# Patient Record
Sex: MALE | Race: WHITE | NOT HISPANIC OR LATINO | Employment: FULL TIME | URBAN - METROPOLITAN AREA
[De-identification: names, ages, dates, MRNs, and addresses within clinical notes are randomized per-mention and may not be internally consistent; named-entity substitution may affect disease eponyms.]

---

## 2022-05-10 ENCOUNTER — OFFICE VISIT (OUTPATIENT)
Dept: INTERNAL MEDICINE CLINIC | Facility: CLINIC | Age: 32
End: 2022-05-10
Payer: COMMERCIAL

## 2022-05-10 VITALS
WEIGHT: 290 LBS | BODY MASS INDEX: 41.52 KG/M2 | OXYGEN SATURATION: 98 % | TEMPERATURE: 97.7 F | HEIGHT: 70 IN | HEART RATE: 82 BPM

## 2022-05-10 DIAGNOSIS — E66.01 CLASS 3 SEVERE OBESITY DUE TO EXCESS CALORIES WITHOUT SERIOUS COMORBIDITY WITH BODY MASS INDEX (BMI) OF 40.0 TO 44.9 IN ADULT (HCC): ICD-10-CM

## 2022-05-10 DIAGNOSIS — M54.2 NECK PAIN: ICD-10-CM

## 2022-05-10 DIAGNOSIS — Z11.59 NEED FOR HEPATITIS C SCREENING TEST: ICD-10-CM

## 2022-05-10 DIAGNOSIS — G89.29 CHRONIC PAIN OF RIGHT KNEE: Primary | ICD-10-CM

## 2022-05-10 DIAGNOSIS — M25.561 CHRONIC PAIN OF RIGHT KNEE: Primary | ICD-10-CM

## 2022-05-10 DIAGNOSIS — E55.9 VITAMIN D DEFICIENCY: ICD-10-CM

## 2022-05-10 DIAGNOSIS — M54.50 CHRONIC MIDLINE LOW BACK PAIN WITHOUT SCIATICA: ICD-10-CM

## 2022-05-10 DIAGNOSIS — G44.219 EPISODIC TENSION-TYPE HEADACHE, NOT INTRACTABLE: ICD-10-CM

## 2022-05-10 DIAGNOSIS — G89.29 CHRONIC MIDLINE LOW BACK PAIN WITHOUT SCIATICA: ICD-10-CM

## 2022-05-10 PROBLEM — E66.813 CLASS 3 SEVERE OBESITY DUE TO EXCESS CALORIES WITHOUT SERIOUS COMORBIDITY IN ADULT (HCC): Status: ACTIVE | Noted: 2022-05-10

## 2022-05-10 PROCEDURE — 3008F BODY MASS INDEX DOCD: CPT | Performed by: INTERNAL MEDICINE

## 2022-05-10 PROCEDURE — 99205 OFFICE O/P NEW HI 60 MIN: CPT | Performed by: INTERNAL MEDICINE

## 2022-05-10 PROCEDURE — 3725F SCREEN DEPRESSION PERFORMED: CPT | Performed by: INTERNAL MEDICINE

## 2022-05-10 RX ORDER — MELOXICAM 15 MG/1
15 TABLET ORAL DAILY
Qty: 20 TABLET | Refills: 0 | Status: SHIPPED | OUTPATIENT
Start: 2022-05-10

## 2022-05-10 RX ORDER — LORATADINE 10 MG/1
10 TABLET ORAL DAILY
COMMUNITY

## 2022-05-10 RX ORDER — ACETAMINOPHEN 500 MG
500 TABLET ORAL EVERY 6 HOURS PRN
COMMUNITY

## 2022-05-10 NOTE — ASSESSMENT & PLAN NOTE
Differential diagnosis of headache were reviewed  There are only 4 or 5 episodes not time impressive not associated with any focal neurological symptoms  Will monitor the trend  Will get CBCs sed rate Lyme titer JEMIMA min    I do not believe there is a need for imaging at this time unless it gets worse patient is in agreement for that

## 2022-05-10 NOTE — ASSESSMENT & PLAN NOTE
Differential diagnosis of knee a reviewed the rule out DJD rule out DN range mint of ligaments  Rule out a [place] osteoarthritis patient also health of father with [employer] business and may be expose of risk  Will check CBCs CMP sed rate Lyme titer rheumatoid factor JEMIMA a P appetite is the TSH  Recommend to try Mobic 15 mg daily as needed      Side effects reviewed

## 2022-05-10 NOTE — ASSESSMENT & PLAN NOTE
Back prevention strategy reviewed  Will do lumbar spine x-ray along with knee x-ray    Will try Mobic 15 mg 1 daily as needed

## 2022-05-10 NOTE — PROGRESS NOTES
[facility] Office Visit Note  05/10/22     Hali Frost 32 y o  male MRN: [id number]  : 1990    Assessment:     1  Chronic pain of right knee  Assessment & Plan:  Differential diagnosis of knee a reviewed the rule out DJD rule out DN range mint of ligaments  Rule out a [place] osteoarthritis patient also health of father with [employer] business and may be expose of risk  Will check CBCs CMP sed rate Lyme titer rheumatoid factor JEMIMA a P appetite is the TSH  Recommend to try Mobic 15 mg daily as needed  Side effects reviewed     Orders:  -     XR knee 3 vw left non injury; Future; Expected date: 2022  -     Comprehensive metabolic panel; Future; Expected date: 2022  -     CBC and differential; Future; Expected date: 2022  -     Lipid panel; Future; Expected date: 2022  -     Vitamin D 25 hydroxy; Future  -     Sedimentation rate, automated; Future; Expected date: 2022  -     RF Screen w/ Reflex to Titer; Future; Expected date: 2022  -     Antinuclear Antibodies (JEMIMA), IFA; Future; Expected date: 2022  -     Lyme Antibody Profile with reflex to WB; Future; Expected date: 2022  -     Hepatitis C antibody; Future; Expected date: 2022  -     TSH, 3rd generation; Future; Expected date: 2022  -     XR spine lumbar minimum 4 views non injury; Future; Expected date: 05/10/2022  -     UA (URINE) with reflex to Scope  -     meloxicam (Mobic) 15 mg tablet; Take 1 tablet (15 mg total) by mouth daily    2  Chronic midline low back pain without sciatica  Assessment & Plan:  Back prevention strategy reviewed  Will do lumbar spine x-ray along with knee x-ray  Will try Mobic 15 mg 1 daily as needed    Orders:  -     XR knee 3 vw left non injury; Future; Expected date: 2022  -     Comprehensive metabolic panel; Future; Expected date: 2022  -     CBC and differential; Future; Expected date: 2022  -     Lipid panel;  Future; Expected date: 05/17/2022  -     Sedimentation rate, automated; Future; Expected date: 05/17/2022  -     RF Screen w/ Reflex to Titer; Future; Expected date: 05/17/2022  -     Antinuclear Antibodies (JEMIMA), IFA; Future; Expected date: 05/17/2022  -     Lyme Antibody Profile with reflex to WB; Future; Expected date: 05/17/2022  -     Hepatitis C antibody; Future; Expected date: 05/17/2022  -     TSH, 3rd generation; Future; Expected date: 05/17/2022  -     XR spine lumbar minimum 4 views non injury; Future; Expected date: 05/10/2022  -     UA (URINE) with reflex to Scope  -     meloxicam (Mobic) 15 mg tablet; Take 1 tablet (15 mg total) by mouth daily    3  Class 3 severe obesity due to excess calories without serious comorbidity with body mass index (BMI) of 40 0 to 44 9 in adult [facility])  Assessment & Plan:  Weight loss strategies reviewed  Will do appropriate lab work  Recommend to join gym  Recommend daily weight    BMI Counseling: The BMI is above normal  Know Body weight goal    Weigh yourself daily or weekly as per your doctor    Nutrition recommendations include decreasing portion sizes, encouraging healthy choices of fruits and vegetables, decreasing     fast food intake, consuming healthier snacks, limiting drinks that contain sugar, moderation in carbohydrate intake, increasing     intake of lean protein, reducing intake of saturated and trans fat and reducing intake of cholesterol      Orders:  -     Comprehensive metabolic panel; Future; Expected date: 05/17/2022  -     CBC and differential; Future; Expected date: 05/17/2022  -     Lipid panel; Future; Expected date: 05/17/2022  -     TSH, 3rd generation; Future; Expected date: 05/17/2022    4  Episodic tension-type headache, not intractable  Assessment & Plan:  Differential diagnosis of headache were reviewed  There are only 4 or 5 episodes not time impressive not associated with any focal neurological symptoms  Will monitor the trend    Will get CBCs sed rate Lyme titer JEMIMA min  I do not believe there is a need for imaging at this time unless it gets worse patient is in agreement for that      5  Neck pain  Assessment & Plan:  Neck examination is normal   No imaging is needed  Prevention strategy    Orders:  -     XR knee 3 vw left non injury; Future; Expected date: 05/17/2022  -     Comprehensive metabolic panel; Future; Expected date: 05/17/2022  -     CBC and differential; Future; Expected date: 05/17/2022  -     Lipid panel; Future; Expected date: 05/17/2022  -     Vitamin D 25 hydroxy; Future  -     Sedimentation rate, automated; Future; Expected date: 05/17/2022  -     RF Screen w/ Reflex to Titer; Future; Expected date: 05/17/2022  -     Antinuclear Antibodies (JEMIMA), IFA; Future; Expected date: 05/17/2022  -     Lyme Antibody Profile with reflex to WB; Future; Expected date: 05/17/2022  -     Hepatitis C antibody; Future; Expected date: 05/17/2022  -     TSH, 3rd generation; Future; Expected date: 05/17/2022  -     XR spine lumbar minimum 4 views non injury; Future; Expected date: 05/10/2022  -     UA (URINE) with reflex to Scope  -     meloxicam (Mobic) 15 mg tablet; Take 1 tablet (15 mg total) by mouth daily    6  Vitamin D deficiency  -     Vitamin D 25 hydroxy; Future    7  Need for hepatitis C screening test  -     Hepatitis C antibody; Future; Expected date: 05/17/2022        Discussion Summary and Plan: Today's care plan and medications were reviewed with patient in detail and all their questions answered to their satisfaction  No chief complaint on file  Subjective: This is the 1st office visit  35-year-old gentleman patient is a new patient visit  He has a son of 1 of our patient  He used to live in [place] recently moved with family here      Complains of right knee pain for last 2 years more on the lateral side denies any instability denies any fall injury denies any previous knee x-ray denies any previous DJD or any connective tissue disease home denies any fracture used to work some used to place some I soaking in the remote past   The has not seen any orthopedic the add did not have any x-ray did not have any medications tried or injection done  Denies any instability symptoms  Pain is mild to moderate  Takes a Tylenol at times  With partial relief  Pain exacerbated with activity whenever he is on knee  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     Denies any swelling of the joint  Denies any history of Lyme disease history of Lyme disease    Next complaint is that of a low back pain for last 1 year mind home often on pain is mild to moderate  Denies any radiation tingling numbness weakness of the lower extremity  Denies any bladder symptoms  Denies any previous back injury  Denies any previous back surgery  Has not seen any new orthopedic doctor  Never had physical therapy  Never had epidural injection done  No unusual arthritis history  Home no other connective tissue disease symptoms  Neck pain 4 times in last 2 months lasting for 5 minute to few hours denies any radiation tingling numbness weakness of the upper extremity  Neck next complaint next complaint is that of a episode of headache come lasting for few minutes to 1/2 hour in last 2 3 months of 3-4 episodes without any focal neurological symptoms  Denies any problem with eyes the TMJ swallowing difficulty and a  Has not had any association with neck pain  Neck pain is frontal   Does not have any any visual symptoms home  No focal weakness tingling numbness  No nausea vomiting  No fever or chills  No previous history of migraine brain tumor how polymyalgia rheumatica  Obesity BMI 41 61 weight you was 250 now it is 290 lb  He used to go to gym he does not go to gym    Will workup and will of weight loss advised      The following portions of the patient's history were reviewed and updated as appropriate: allergies, current medications, past family history, past medical history, past social history, past surgical history and problem list     Review of Systems   All other systems reviewed and are negative  Historical Information   Patient Active Problem List   Diagnosis    Chronic pain of right knee    Chronic midline low back pain without sciatica    Class 3 severe obesity due to excess calories without serious comorbidity in adult ([facility] [address] )    Episodic tension-type headache    Neck pain     Past Medical History:   Diagnosis Date    Allergic     Anxiety     Off and on     Past Surgical History:   Procedure Laterality Date    WISDOM TOOTH EXTRACTION       Social History     Substance and Sexual Activity   Alcohol Use Not Currently     Social History     Substance and Sexual Activity   Drug Use Never     Social History     Tobacco Use   Smoking Status Current Every Day Smoker   Smokeless Tobacco Never Used     Family History   Problem Relation Age of Onset    Asthma Mother     Thyroid disease Mother     Hyperlipidemia Mother     Hypertension Father      Health Maintenance Due   Topic    Hepatitis C Screening     COVID-19 Vaccine (1)    Pneumococcal Vaccine: Pediatrics (0 to 5 Years) and At-Risk Patients (6 to 59 Years) (1 of 2 - PPSV23)    HIV Screening     BMI: Followup Plan     Annual Physical     DTaP,Tdap,and Td Vaccines (1 - Tdap)      Meds/Allergies       Current Outpatient Medications:     acetaminophen (TYLENOL) 500 mg tablet, Take 500 mg by mouth every 6 (six) hours as needed for mild pain, Disp: , Rfl:     loratadine (CLARITIN) 10 mg tablet, Take 10 mg by mouth daily, Disp: , Rfl:     meloxicam (Mobic) 15 mg tablet, Take 1 tablet (15 mg total) by mouth daily, Disp: 20 tablet, Rfl: 0      Objective:    Vitals:   Pulse 82   Temp 97 7 °F (36 5 °C)   Ht 5' 10" (1 778 m)   Wt 132 kg (290 lb)   SpO2 98%   BMI 41 61 kg/m²   Body mass index is 41 61 kg/m²    Vitals:    05/10/22 1315   Weight: 132 kg (290 lb)       Physical Exam  Constitutional:       General: He is not in acute distress  Appearance: He is well-developed  He is not ill-appearing or diaphoretic  HENT:      Head: Normocephalic and atraumatic  Jaw: No swelling  Salivary Glands: Right salivary gland is not diffusely enlarged or tender  Left salivary gland is not diffusely enlarged or tender  Right Ear: Hearing, tympanic membrane, ear canal and external ear normal       Left Ear: Hearing, tympanic membrane, ear canal and external ear normal       Nose:      Right Turbinates: Not pale  Left Turbinates: Not pale  Mouth/Throat:      Mouth: No oral lesions  Tongue: No lesions  Pharynx: Oropharynx is clear  Tonsils: No tonsillar exudate  Eyes:      General: Lids are normal  No visual field deficit  Right eye: No discharge  Left eye: No discharge  Conjunctiva/sclera: Conjunctivae normal    Neck:      Thyroid: No thyroid mass or thyromegaly  Vascular: No carotid bruit or JVD  Trachea: Trachea normal    Cardiovascular:      Rate and Rhythm: Regular rhythm  Heart sounds: Normal heart sounds  Pulmonary:      Effort: No respiratory distress  Breath sounds: Normal breath sounds  No wheezing or rales  Abdominal:      General: Bowel sounds are normal  There is no distension  Palpations: There is no mass  Tenderness: There is no rebound  Musculoskeletal:      Cervical back: No signs of trauma, lacerations, rigidity, spasms, torticollis, tenderness, bony tenderness or crepitus  No pain with movement  Normal range of motion  Lumbar back: No lacerations, spasms, tenderness or bony tenderness  Normal range of motion  Negative right straight leg raise test and negative left straight leg raise test  No scoliosis  Right knee: Bony tenderness present  No swelling, deformity, effusion, erythema, ecchymosis or crepitus  Normal range of motion   Tenderness present over the lateral joint line  No MCL, LCL, ACL or PCL tenderness  Normal alignment, normal meniscus and normal patellar mobility  Right lower leg: No edema  Left lower leg: No edema  Lymphadenopathy:      Cervical: No cervical adenopathy  Skin:     General: Skin is warm  Coloration: Skin is not jaundiced or pale  Findings: No rash  Neurological:      Mental Status: He is alert and oriented to person, place, and time  Cranial Nerves: Cranial nerves are intact  No cranial nerve deficit, dysarthria or facial asymmetry  Sensory: No sensory deficit  Motor: No weakness, tremor, atrophy, abnormal muscle tone or pronator drift  Coordination: Romberg sign positive  Coordination normal       Gait: Gait normal       Deep Tendon Reflexes: Reflexes are normal and symmetric  Psychiatric:         Behavior: Behavior normal          Judgment: Judgment normal          Lab Review   No visits with results within 2 Month(s) from this visit  Latest known visit with results is:   No results found for any previous visit  Patient Instructions     Go for the blood test as ordered regarding your back pain neck pain right knee pain as well as for the weight evaluation  Consider joining a gym  Take meloxicam 15 mg 1 daily for knee pain or and oral low back pain  Consider getting a right knee x-ray and lumbar spine x-ray at your convenience  Be careful with weightlifting pulling pushing  Follow with Consultants as per their and our suggestion    Follow up in 3  week(s) or as needed earlier    Follow all instructions as advised and discussed  Take your medications as prescribed  Call the office immediately if you experience any side effects  Ask questions if you do not understand  Keep your scheduled appointment as advised or come sooner if necessary or in doubt  Best time to call for non-urgent matter or questions on weekdays is between 9am and 12 noon      See physician for any new symptoms or worsening of current symptoms  Urgent or emergent situations call 911 and report to nearest emergency room  I spent  60 minutes taking care of this patient including clinical care, conseling, collaboration, chart, lab and consultaion review  Patient is to get labs 1 week(s) prior to next visit if advised      Knee Pain   AMBULATORY CARE:   Knee pain  may start suddenly, or it may be a long-term problem  You may have pain on the side, front, or back of your knee  You may have knee stiffness and swelling  You may hear popping sounds or feel like your knee is giving way or locking up as you walk  You may feel pain when you sit, stand, walk, or climb up and down stairs  Knee pain can be caused by conditions such as obesity, inflammation, or strains or tears in ligaments or tendons  Seek care immediately if:   · Your pain is worse, even after treatment  · You cannot bend or straighten your leg completely  · The swelling around your knee does not go down even with treatment  · Your knee is painful and hot to the touch  Contact your healthcare provider if:   · You have questions or concerns about your condition or care  Treatment  will depend on the cause of your pain  You may need any of the following:  · NSAIDs  help decrease swelling and pain or fever  This medicine is available with or without a doctor's order  NSAIDs can cause stomach bleeding or kidney problems in certain people  If you take blood thinner medicine, always ask your healthcare provider if NSAIDs are safe for you  Always read the medicine label and follow directions  · Acetaminophen  decreases pain and fever  It is available without a doctor's order  Ask how much to take and how often to take it  Follow directions   Read the labels of all other medicines you are using to see if they also contain acetaminophen, or ask your doctor or pharmacist  Acetaminophen can cause liver damage if not taken correctly  Do not use more than 4 grams (4,000 milligrams) total of acetaminophen in one day  · Prescription pain medicine  may be given  Ask your healthcare provider how to take this medicine safely  Some prescription pain medicines contain acetaminophen  Do not take other medicines that contain acetaminophen without talking to your healthcare provider  Too much acetaminophen may cause liver damage  Prescription pain medicine may cause constipation  Ask your healthcare provider how to prevent or treat constipation  · Steroid injections  may be given into your knee  Steroids reduce inflammation and pain  · Surgery  may be used for some injuries, such as to repair a torn ACL  What you can do to manage your symptoms:   · Rest your knee so it can heal   Limit activities that increase your pain  Do low-impact exercises, such as walking or swimming  · Apply ice to help reduce swelling and pain  Use an ice pack, or put crushed ice in a plastic bag  Cover it with a towel before you apply it to your knee  Apply ice for 15 to 20 minutes every hour, or as directed  · Apply compression to help reduce swelling  Use a brace or bandage only as directed  · Elevate your knee to help decrease pain and swelling  Elevate your knee while you are sitting or lying down  Prop your leg on pillows to keep your knee above the level of your heart  · Prevent your knee from moving as directed  Your healthcare provider may put on a cast or splint  You may need to wear a leg brace to stabilize your knee  A leg brace can be adjusted to increase your range of motion as your knee heals  What you can do to prevent knee pain:   · Maintain a healthy weight  Extra weight increases your risk for knee pain  Ask your healthcare provider how much you should weigh  He or she can help you create a safe weight loss plan if you need to lose weight  · Exercise or train properly    Use the correct equipment for sports  Wear shoes that provide good support  Check your posture often as you exercise, play sports, or train for an event  This can help prevent stress and strain on your knees  Rest between sessions so you do not overwork your knees  Follow up with your healthcare provider within 24 hours or as directed:  Write down your questions so you remember to ask them during your visits  © Copyright [employer] 2022 Information is for End User's use only and may not be sold, redistributed or otherwise used for commercial purposes  All illustrations and images included in CareNotes® are the copyrighted property of [employer]  or Alex Fernando   The above information is an  only  It is not intended as medical advice for individual conditions or treatments  Talk to your doctor, nurse or pharmacist before following any medical regimen to see if it is safe and effective for you  Acute Low Back Pain   WHAT YOU NEED TO KNOW:   Acute low back pain is sudden discomfort that lasts up to 6 weeks and makes activity difficult  DISCHARGE INSTRUCTIONS:   Return to the emergency department if:   · You have severe pain  · You have sudden stiffness and heaviness on both buttocks down to both legs  · You have numbness or weakness in one leg, or pain in both legs  · You have numbness in your genital area or across your lower back  · You cannot control your urine or bowel movements  Call your doctor if:   · You have a fever  · You have pain at night or when you rest     · Your pain does not get better with treatment  · You have pain that worsens when you cough or sneeze  · You suddenly feel something pop or snap in your back  · You have questions or concerns about your condition or care  Medicines: You may need any of the following:  · NSAIDs , such as ibuprofen, help decrease swelling, pain, and fever  This medicine is available with or without a doctor's order   NSAIDs can cause stomach bleeding or kidney problems in certain people  If you take blood thinner medicine, always ask your healthcare provider if NSAIDs are safe for you  Always read the medicine label and follow directions  · Acetaminophen  decreases pain and fever  It is available without a doctor's order  Ask how much to take and how often to take it  Follow directions  Read the labels of all other medicines you are using to see if they also contain acetaminophen, or ask your doctor or pharmacist  Acetaminophen can cause liver damage if not taken correctly  Do not use more than 4 grams (4,000 milligrams) total of acetaminophen in one day  · Muscle relaxers  decrease pain by relaxing the muscles in your lower spine  · Prescription pain medicine  may be given  Ask your healthcare provider how to take this medicine safely  Some prescription pain medicines contain acetaminophen  Do not take other medicines that contain acetaminophen without talking to your healthcare provider  Too much acetaminophen may cause liver damage  Prescription pain medicine may cause constipation  Ask your healthcare provider how to prevent or treat constipation  Self-care:   · Stay active  as much as you can without causing more pain  Bed rest could make your back pain worse  Start with some light exercises, such as walking  Avoid heavy lifting until your pain is gone  Ask for more information about the activities or exercises that are right for you  · Apply heat  on your back for 20 to 30 minutes every 2 hours for as many days as directed  Heat helps decrease pain and muscle spasms  Alternate heat and ice  · Apply ice  on your back for 15 to 20 minutes every hour or as directed  Use an ice pack, or put crushed ice in a plastic bag  Cover it with a towel before you apply it to your skin  Ice helps prevent tissue damage and decreases swelling and pain  Prevent acute low back pain:   · Use proper body mechanics  ?  Bend at the hips and knees when you  objects  Do not bend from the waist  Use your leg muscles as you lift the load  Do not use your back  Keep the object close to your chest as you lift it  Try not to twist or lift anything above your waist          ? Change your position often when you stand for long periods of time  Rest one foot on a small box or footrest, and then switch to the other foot often  ? Try not to sit for long periods of time  When you do, sit in a straight-backed chair with your feet flat on the floor  Never reach, pull, or push while you are sitting  · Do exercises that strengthen your back muscles  Warm up before you exercise  Ask your healthcare provider the best exercises for you  · Maintain a healthy weight  Ask your healthcare provider what a healthy weight is for you  Ask him or her to help you create a weight loss plan if you are overweight  Follow up with your doctor as directed:  Return for a follow-up visit if you still have pain after 1 to 3 weeks of treatment  You may need to visit an orthopedist if your back pain lasts longer than 12 weeks  Write down your questions so you remember to ask them during your visits  © Copyright [employer] 2022 Information is for End User's use only and may not be sold, redistributed or otherwise used for commercial purposes  All illustrations and images included in CareNotes® are the copyrighted property of [employer] Inc  or [facility] Shawna Fernando   The above information is an  only  It is not intended as medical advice for individual conditions or treatments  Talk to your doctor, nurse or pharmacist before following any medical regimen to see if it is safe and effective for you  BMI Counseling:       The BMI is above normal  Know Body weight goal    Weigh yourself daily or weekly as per your doctor    Nutrition recommendations include decreasing portion sizes, encouraging healthy choices of fruits and vegetables, decreasing fast food intake, consuming healthier snacks, limiting drinks that contain sugar, moderation in carbohydrate intake, increasing     intake of lean protein, reducing intake of saturated and trans fat and reducing intake of cholesterol           Dr Abdon Jaramillo MD  [facility]       "This note has been constructed using a voice recognition system  Therefore there may be syntax, spelling, and/or grammatical errors   Please call if you have any questions  "

## 2022-05-10 NOTE — PATIENT INSTRUCTIONS
Go for the blood test as ordered regarding your back pain neck pain right knee pain as well as for the weight evaluation  Consider joining a gym  Take meloxicam 15 mg 1 daily for knee pain or and oral low back pain  Consider getting a right knee x-ray and lumbar spine x-ray at your convenience  Be careful with weightlifting pulling pushing  Follow with Consultants as per their and our suggestion    Follow up in 3  week(s) or as needed earlier    Follow all instructions as advised and discussed  Take your medications as prescribed  Call the office immediately if you experience any side effects  Ask questions if you do not understand  Keep your scheduled appointment as advised or come sooner if necessary or in doubt  Best time to call for non-urgent matter or questions on weekdays is between 9am and 12 noon  See physician for any new symptoms or worsening of current symptoms  Urgent or emergent situations call 911 and report to nearest emergency room  I spent  60 minutes taking care of this patient including clinical care, conseling, collaboration, chart, lab and consultaion review  Patient is to get labs 1 week(s) prior to next visit if advised      Knee Pain   AMBULATORY CARE:   Knee pain  may start suddenly, or it may be a long-term problem  You may have pain on the side, front, or back of your knee  You may have knee stiffness and swelling  You may hear popping sounds or feel like your knee is giving way or locking up as you walk  You may feel pain when you sit, stand, walk, or climb up and down stairs  Knee pain can be caused by conditions such as obesity, inflammation, or strains or tears in ligaments or tendons  Seek care immediately if:   · Your pain is worse, even after treatment  · You cannot bend or straighten your leg completely  · The swelling around your knee does not go down even with treatment  · Your knee is painful and hot to the touch      Contact your healthcare provider if:   · You have questions or concerns about your condition or care  Treatment  will depend on the cause of your pain  You may need any of the following:  · NSAIDs  help decrease swelling and pain or fever  This medicine is available with or without a doctor's order  NSAIDs can cause stomach bleeding or kidney problems in certain people  If you take blood thinner medicine, always ask your healthcare provider if NSAIDs are safe for you  Always read the medicine label and follow directions  · Acetaminophen  decreases pain and fever  It is available without a doctor's order  Ask how much to take and how often to take it  Follow directions  Read the labels of all other medicines you are using to see if they also contain acetaminophen, or ask your doctor or pharmacist  Acetaminophen can cause liver damage if not taken correctly  Do not use more than 4 grams (4,000 milligrams) total of acetaminophen in one day  · Prescription pain medicine  may be given  Ask your healthcare provider how to take this medicine safely  Some prescription pain medicines contain acetaminophen  Do not take other medicines that contain acetaminophen without talking to your healthcare provider  Too much acetaminophen may cause liver damage  Prescription pain medicine may cause constipation  Ask your healthcare provider how to prevent or treat constipation  · Steroid injections  may be given into your knee  Steroids reduce inflammation and pain  · Surgery  may be used for some injuries, such as to repair a torn ACL  What you can do to manage your symptoms:   · Rest your knee so it can heal   Limit activities that increase your pain  Do low-impact exercises, such as walking or swimming  · Apply ice to help reduce swelling and pain  Use an ice pack, or put crushed ice in a plastic bag  Cover it with a towel before you apply it to your knee   Apply ice for 15 to 20 minutes every hour, or as directed  · Apply compression to help reduce swelling  Use a brace or bandage only as directed  · Elevate your knee to help decrease pain and swelling  Elevate your knee while you are sitting or lying down  Prop your leg on pillows to keep your knee above the level of your heart  · Prevent your knee from moving as directed  Your healthcare provider may put on a cast or splint  You may need to wear a leg brace to stabilize your knee  A leg brace can be adjusted to increase your range of motion as your knee heals  What you can do to prevent knee pain:   · Maintain a healthy weight  Extra weight increases your risk for knee pain  Ask your healthcare provider how much you should weigh  He or she can help you create a safe weight loss plan if you need to lose weight  · Exercise or train properly  Use the correct equipment for sports  Wear shoes that provide good support  Check your posture often as you exercise, play sports, or train for an event  This can help prevent stress and strain on your knees  Rest between sessions so you do not overwork your knees  Follow up with your healthcare provider within 24 hours or as directed:  Write down your questions so you remember to ask them during your visits  © Copyright [employer] 2022 Information is for End User's use only and may not be sold, redistributed or otherwise used for commercial purposes  All illustrations and images included in CareNotes® are the copyrighted property of [employer]  or Alex Fernando   The above information is an  only  It is not intended as medical advice for individual conditions or treatments  Talk to your doctor, nurse or pharmacist before following any medical regimen to see if it is safe and effective for you  Acute Low Back Pain   WHAT YOU NEED TO KNOW:   Acute low back pain is sudden discomfort that lasts up to 6 weeks and makes activity difficult    DISCHARGE INSTRUCTIONS:   Return to the emergency department if:   · You have severe pain  · You have sudden stiffness and heaviness on both buttocks down to both legs  · You have numbness or weakness in one leg, or pain in both legs  · You have numbness in your genital area or across your lower back  · You cannot control your urine or bowel movements  Call your doctor if:   · You have a fever  · You have pain at night or when you rest     · Your pain does not get better with treatment  · You have pain that worsens when you cough or sneeze  · You suddenly feel something pop or snap in your back  · You have questions or concerns about your condition or care  Medicines: You may need any of the following:  · NSAIDs , such as ibuprofen, help decrease swelling, pain, and fever  This medicine is available with or without a doctor's order  NSAIDs can cause stomach bleeding or kidney problems in certain people  If you take blood thinner medicine, always ask your healthcare provider if NSAIDs are safe for you  Always read the medicine label and follow directions  · Acetaminophen  decreases pain and fever  It is available without a doctor's order  Ask how much to take and how often to take it  Follow directions  Read the labels of all other medicines you are using to see if they also contain acetaminophen, or ask your doctor or pharmacist  Acetaminophen can cause liver damage if not taken correctly  Do not use more than 4 grams (4,000 milligrams) total of acetaminophen in one day  · Muscle relaxers  decrease pain by relaxing the muscles in your lower spine  · Prescription pain medicine  may be given  Ask your healthcare provider how to take this medicine safely  Some prescription pain medicines contain acetaminophen  Do not take other medicines that contain acetaminophen without talking to your healthcare provider  Too much acetaminophen may cause liver damage  Prescription pain medicine may cause constipation   Ask your healthcare provider how to prevent or treat constipation  Self-care:   · Stay active  as much as you can without causing more pain  Bed rest could make your back pain worse  Start with some light exercises, such as walking  Avoid heavy lifting until your pain is gone  Ask for more information about the activities or exercises that are right for you  · Apply heat  on your back for 20 to 30 minutes every 2 hours for as many days as directed  Heat helps decrease pain and muscle spasms  Alternate heat and ice  · Apply ice  on your back for 15 to 20 minutes every hour or as directed  Use an ice pack, or put crushed ice in a plastic bag  Cover it with a towel before you apply it to your skin  Ice helps prevent tissue damage and decreases swelling and pain  Prevent acute low back pain:   · Use proper body mechanics  ? Bend at the hips and knees when you  objects  Do not bend from the waist  Use your leg muscles as you lift the load  Do not use your back  Keep the object close to your chest as you lift it  Try not to twist or lift anything above your waist          ? Change your position often when you stand for long periods of time  Rest one foot on a small box or footrest, and then switch to the other foot often  ? Try not to sit for long periods of time  When you do, sit in a straight-backed chair with your feet flat on the floor  Never reach, pull, or push while you are sitting  · Do exercises that strengthen your back muscles  Warm up before you exercise  Ask your healthcare provider the best exercises for you  · Maintain a healthy weight  Ask your healthcare provider what a healthy weight is for you  Ask him or her to help you create a weight loss plan if you are overweight  Follow up with your doctor as directed:  Return for a follow-up visit if you still have pain after 1 to 3 weeks of treatment   You may need to visit an orthopedist if your back pain lasts longer than 12 weeks  Write down your questions so you remember to ask them during your visits  © Copyright [employer] 2022 Information is for End User's use only and may not be sold, redistributed or otherwise used for commercial purposes  All illustrations and images included in CareNotes® are the copyrighted property of [employer]  or Alex Costello  The above information is an  only  It is not intended as medical advice for individual conditions or treatments  Talk to your doctor, nurse or pharmacist before following any medical regimen to see if it is safe and effective for you  BMI Counseling:       The BMI is above normal  Know Body weight goal    Weigh yourself daily or weekly as per your doctor    Nutrition recommendations include decreasing portion sizes, encouraging healthy choices of fruits and vegetables, decreasing     fast food intake, consuming healthier snacks, limiting drinks that contain sugar, moderation in carbohydrate intake, increasing     intake of lean protein, reducing intake of saturated and trans fat and reducing intake of cholesterol

## 2022-05-10 NOTE — ASSESSMENT & PLAN NOTE
Weight loss strategies reviewed  Will do appropriate lab work  Recommend to join gym  Recommend daily weight    BMI Counseling:       The BMI is above normal  Know Body weight goal    Weigh yourself daily or weekly as per your doctor    Nutrition recommendations include decreasing portion sizes, encouraging healthy choices of fruits and vegetables, decreasing     fast food intake, consuming healthier snacks, limiting drinks that contain sugar, moderation in carbohydrate intake, increasing     intake of lean protein, reducing intake of saturated and trans fat and reducing intake of cholesterol

## 2022-05-23 ENCOUNTER — HOSPITAL ENCOUNTER (OUTPATIENT)
Dept: RADIOLOGY | Facility: HOSPITAL | Age: 32
Discharge: HOME | End: 2022-05-23
Attending: INTERNAL MEDICINE
Payer: COMMERCIAL

## 2022-05-23 DIAGNOSIS — M54.50 CHRONIC MIDLINE LOW BACK PAIN WITHOUT SCIATICA: ICD-10-CM

## 2022-05-23 DIAGNOSIS — M54.2 NECK PAIN: ICD-10-CM

## 2022-05-23 DIAGNOSIS — M25.561 CHRONIC PAIN OF RIGHT KNEE: ICD-10-CM

## 2022-05-23 DIAGNOSIS — G89.29 CHRONIC MIDLINE LOW BACK PAIN WITHOUT SCIATICA: ICD-10-CM

## 2022-05-23 DIAGNOSIS — G89.29 CHRONIC PAIN OF RIGHT KNEE: ICD-10-CM

## 2022-05-23 PROCEDURE — 72110 X-RAY EXAM L-2 SPINE 4/>VWS: CPT

## 2022-05-23 PROCEDURE — 73562 X-RAY EXAM OF KNEE 3: CPT

## 2022-06-01 LAB
25(OH)D3 SERPL-MCNC: 32 NG/ML (ref 30–100)
ALBUMIN SERPL-MCNC: 4.3 G/DL (ref 3.6–5.1)
ALBUMIN/GLOB SERPL: 2 (CALC) (ref 1–2.5)
ALP SERPL-CCNC: 57 U/L (ref 36–130)
ALT SERPL-CCNC: 41 U/L (ref 9–46)
ANA SER QL IF: NEGATIVE
APPEARANCE UR: CLEAR
AST SERPL-CCNC: 20 U/L (ref 10–40)
B BURGDOR AB SER IA-ACNC: <0.9 INDEX
BASOPHILS # BLD AUTO: 53 CELLS/UL (ref 0–200)
BASOPHILS NFR BLD AUTO: 0.7 %
BILIRUB SERPL-MCNC: 1 MG/DL (ref 0.2–1.2)
BILIRUB UR QL STRIP: NEGATIVE
BUN SERPL-MCNC: 12 MG/DL (ref 7–25)
BUN/CREAT SERPL: NORMAL (CALC) (ref 6–22)
CALCIUM SERPL-MCNC: 9.1 MG/DL (ref 8.6–10.3)
CHLORIDE SERPL-SCNC: 104 MMOL/L (ref 98–110)
CHOLEST SERPL-MCNC: 148 MG/DL
CHOLEST/HDLC SERPL: 3.5 (CALC)
CO2 SERPL-SCNC: 29 MMOL/L (ref 20–32)
COLOR UR: YELLOW
CREAT SERPL-MCNC: 0.85 MG/DL (ref 0.6–1.35)
EOSINOPHIL # BLD AUTO: 213 CELLS/UL (ref 15–500)
EOSINOPHIL NFR BLD AUTO: 2.8 %
ERYTHROCYTE [DISTWIDTH] IN BLOOD BY AUTOMATED COUNT: 12.4 % (ref 11–15)
ERYTHROCYTE [SEDIMENTATION RATE] IN BLOOD BY WESTERGREN METHOD: 2 MM/H
GLOBULIN SER CALC-MCNC: 2.2 G/DL (CALC) (ref 1.9–3.7)
GLUCOSE SERPL-MCNC: 87 MG/DL (ref 65–99)
GLUCOSE UR QL STRIP: NEGATIVE
HCT VFR BLD AUTO: 43.8 % (ref 38.5–50)
HCV AB S/CO SERPL IA: <0.02
HCV AB SERPL QL IA: NORMAL
HDLC SERPL-MCNC: 42 MG/DL
HGB BLD-MCNC: 14.8 G/DL (ref 13.2–17.1)
HGB UR QL STRIP: NEGATIVE
KETONES UR QL STRIP: NEGATIVE
LDLC SERPL CALC-MCNC: 90 MG/DL (CALC)
LEUKOCYTE ESTERASE UR QL STRIP: NEGATIVE
LYMPHOCYTES # BLD AUTO: 2204 CELLS/UL (ref 850–3900)
LYMPHOCYTES NFR BLD AUTO: 29 %
MCH RBC QN AUTO: 28.6 PG (ref 27–33)
MCHC RBC AUTO-ENTMCNC: 33.8 G/DL (ref 32–36)
MCV RBC AUTO: 84.7 FL (ref 80–100)
MONOCYTES # BLD AUTO: 669 CELLS/UL (ref 200–950)
MONOCYTES NFR BLD AUTO: 8.8 %
NEUTROPHILS # BLD AUTO: 4461 CELLS/UL (ref 1500–7800)
NEUTROPHILS NFR BLD AUTO: 58.7 %
NITRITE UR QL STRIP: NEGATIVE
NONHDLC SERPL-MCNC: 106 MG/DL (CALC)
PH UR STRIP: 5.5 [PH] (ref 5–8)
PLATELET # BLD AUTO: 205 THOUSAND/UL (ref 140–400)
PMV BLD REES-ECKER: 9.9 FL (ref 7.5–12.5)
POTASSIUM SERPL-SCNC: 4.7 MMOL/L (ref 3.5–5.3)
PROT SERPL-MCNC: 6.5 G/DL (ref 6.1–8.1)
PROT UR QL STRIP: NEGATIVE
RBC # BLD AUTO: 5.17 MILLION/UL (ref 4.2–5.8)
RHEUMATOID FACT SERPL-ACNC: <14 IU/ML
SL AMB EGFR AFRICAN AMERICAN: 135 ML/MIN/1.73M2
SL AMB EGFR NON AFRICAN AMERICAN: 116 ML/MIN/1.73M2
SODIUM SERPL-SCNC: 140 MMOL/L (ref 135–146)
SP GR UR STRIP: 1.02 (ref 1–1.03)
TRIGL SERPL-MCNC: 70 MG/DL
TSH SERPL-ACNC: 2.07 MIU/L (ref 0.4–4.5)
WBC # BLD AUTO: 7.6 THOUSAND/UL (ref 3.8–10.8)

## 2022-06-09 ENCOUNTER — OFFICE VISIT (OUTPATIENT)
Dept: INTERNAL MEDICINE CLINIC | Facility: CLINIC | Age: 32
End: 2022-06-09
Payer: COMMERCIAL

## 2022-06-09 VITALS
WEIGHT: 287 LBS | HEIGHT: 70 IN | HEART RATE: 71 BPM | SYSTOLIC BLOOD PRESSURE: 140 MMHG | BODY MASS INDEX: 41.09 KG/M2 | DIASTOLIC BLOOD PRESSURE: 80 MMHG | OXYGEN SATURATION: 98 %

## 2022-06-09 DIAGNOSIS — G44.219 EPISODIC TENSION-TYPE HEADACHE, NOT INTRACTABLE: ICD-10-CM

## 2022-06-09 DIAGNOSIS — G89.29 CHRONIC MIDLINE LOW BACK PAIN WITHOUT SCIATICA: ICD-10-CM

## 2022-06-09 DIAGNOSIS — M54.50 CHRONIC MIDLINE LOW BACK PAIN WITHOUT SCIATICA: ICD-10-CM

## 2022-06-09 DIAGNOSIS — E66.01 CLASS 3 SEVERE OBESITY DUE TO EXCESS CALORIES WITHOUT SERIOUS COMORBIDITY WITH BODY MASS INDEX (BMI) OF 40.0 TO 44.9 IN ADULT (HCC): ICD-10-CM

## 2022-06-09 DIAGNOSIS — G89.29 CHRONIC PAIN OF RIGHT KNEE: Primary | ICD-10-CM

## 2022-06-09 DIAGNOSIS — M41.80 LEVOSCOLIOSIS: ICD-10-CM

## 2022-06-09 DIAGNOSIS — M54.2 NECK PAIN: ICD-10-CM

## 2022-06-09 DIAGNOSIS — M25.561 CHRONIC PAIN OF RIGHT KNEE: Primary | ICD-10-CM

## 2022-06-09 PROCEDURE — 3008F BODY MASS INDEX DOCD: CPT | Performed by: INTERNAL MEDICINE

## 2022-06-09 PROCEDURE — 3725F SCREEN DEPRESSION PERFORMED: CPT | Performed by: INTERNAL MEDICINE

## 2022-06-09 PROCEDURE — 99214 OFFICE O/P EST MOD 30 MIN: CPT | Performed by: INTERNAL MEDICINE

## 2022-06-09 NOTE — ASSESSMENT & PLAN NOTE
Mostly mechanical component of pain  Level scoliosis is contributing  Recommend posture  Recommend trial of how meloxicam which he never but it    Will we will back in the 3rd-6 weeks

## 2022-06-09 NOTE — PATIENT INSTRUCTIONS
Your x-ray shows mild arthritis in lower thoracic upper lumbar area as well as level scoliosis  Also you probably have some mechanical component  Be careful how you handle your back during your [place]  Recommend trial of meloxicam which will help your knee pain neck pain and low back pain  If no better the we will be happy to see you earlier if not will see back in 3-6 weeks  Your BMI is 41  Consider losing weight  BMI Counseling: The BMI is above normal  Know Body weight goal    Weigh yourself daily or weekly as per your doctor    Nutrition recommendations include decreasing portion sizes, encouraging healthy choices of fruits and vegetables, decreasing     fast food intake, consuming healthier snacks, limiting drinks that contain sugar, moderation in carbohydrate intake, increasing     intake of lean protein, reducing intake of saturated and trans fat and reducing intake of cholesterol    Follow with Consultants as per their and our suggestion    Follow up in one month or as needed earlier    Follow all instructions as advised and discussed  Take your medications as prescribed  Call the office immediately if you experience any side effects  Ask questions if you do not understand  Keep your scheduled appointment as advised or come sooner if necessary or in doubt  Best time to call for non-urgent matter or questions on weekdays is between 9am and 12 noon  See physician for any new symptoms or worsening of current symptoms  Urgent or emergent situations call 911 and report to nearest emergency room      I spent  30 -40 minutes taking care of this patient including clinical care, conseling, collaboration, chart, lab and consultaion review as appropriate    Patient is to get labs 1 week(s) prior to next visit if advised

## 2022-06-09 NOTE — ASSESSMENT & PLAN NOTE
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

## 2022-06-09 NOTE — PROGRESS NOTES
[facility] Office Visit Note  22     John Dupont 32 y o  male MRN: [id number]  : 1990    Assessment:     1  Chronic pain of right knee  Assessment & Plan:  Right knee pain a there is no significant abnormality on the knee x-rays  Recommend to try meloxicam he never tried he never bought it  Will recheck back in 3-6  We weak  Appears the home probably mechanical   Recommend to lose weight  That will help      2  Chronic midline low back pain without sciatica  Assessment & Plan:  Low back pain how mostly in the lower thoracic upper lumbar a area consistent with mild DJD complicated by his nature of the work 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 as well as the home mechanical component is suspected  Also does have a levoscoliosis  Patient educated he cannot change the [place]  He did not try anti-inflammatory recommend to take meloxicam 15 mg daily for 2 weeks  Will follow back in 3-6 weeks he may ask for the refill if he needs  Side effects explained      3  Episodic tension-type headache, not intractable  Assessment & Plan:  No headache since last visit      4  Neck pain  Assessment & Plan:  Neck pain is mild the recommend to try meloxicam he never brought it  Range of motion is fair  5  Class 3 severe obesity due to excess calories without serious comorbidity with body mass index (BMI) of 40 0 to 44 9 in adult [facility])  Assessment & Plan:  Weight remains significantly high BMI 41  He lost couple  Of lb weight  Diet advised  BMI Counseling:       The BMI is above normal  Know Body weight goal    Weigh yourself daily or weekly as per your doctor    Nutrition recommendations include decreasing portion sizes, encouraging healthy choices of fruits and vegetables, decreasing     fast food intake, consuming healthier snacks, limiting drinks that contain sugar, moderation in carbohydrate intake, increasing     intake of lean protein, reducing intake of saturated and trans fat and reducing intake of cholesterol         6  Levoscoliosis  Assessment & Plan:  Mostly mechanical component of pain  Level scoliosis is contributing  Recommend posture  Recommend trial of how meloxicam which he never but it  Will we will back in the 3rd-6 weeks          Discussion Summary and Plan: Today's care plan and medications were reviewed with patient in detail and all their questions answered to their satisfaction  BP is top-normal will keep an eye    Chief Complaint   Patient presents with    Back Pain    Follow-up     Neck pain, knee pain,      Subjective:  Patient is here for follow-up for review of her low blood test as well as follow-up problems as outlined in the previous 1st visit  Right knee pain going on for last 2 years on the lateral side  Pain is no longer sharp and dull  Usually with activity particularly on the weekend when he works on [employer] work as a part of his job  Patient was given meloxicam he did not pick it up or try at    Low back pain going on for 1 year not excruciating dull  Denies any radiation tingling numbness weakness mild to moderate  Neck pain often on bed bed  No radiation tingling numbness weakness of upper extremity    Neck pain 4 times in last 2 months lasting for 5 minute to few hours denies any radiation tingling numbness weakness of the upper extremity  Obesity BMI 41 61 weight you was 250 now it is 290 lb  He used to go to gym he does not go to gym    Will workup and will of weight loss advised    05/27/2022:  Urinalysis normal, lipid profile normal with LDL is 90, CMP essentially normal with blood sugar 87, CBC essentially normal, Lyme titer negative, rheumatoid factor negative, hepatitis-C negative, vitamin-D 32, TSH 2 07, her cholesterol 148, triglyceride 70, LDL 90, sed rate 2 urinalysis normal    05/23/2022 knee x-rays essentially normal   Lumbar spine reveals lower thoracic upper lumbar DJD otherwise unremarkable but also does have a level scoliosis    He lost 2 lb weight  Blood pressure is top-normal       The following portions of the patient's history were reviewed and updated as appropriate: allergies, current medications, past family history, past medical history, past social history, past surgical history and problem list     Review of Systems   All other systems reviewed and are negative          Historical Information   Patient Active Problem List   Diagnosis    Chronic pain of right knee    Chronic midline low back pain without sciatica    Class 3 severe obesity due to excess calories without serious comorbidity in adult ([facility] [address] )    Episodic tension-type headache    Neck pain    Levoscoliosis     Past Medical History:   Diagnosis Date    Allergic     Anxiety     Off and on     Past Surgical History:   Procedure Laterality Date    WISDOM TOOTH EXTRACTION       Social History     Substance and Sexual Activity   Alcohol Use Not Currently     Social History     Substance and Sexual Activity   Drug Use Never     Social History     Tobacco Use   Smoking Status Current Every Day Smoker   Smokeless Tobacco Never Used     Family History   Problem Relation Age of Onset    Asthma Mother     Thyroid disease Mother     Hyperlipidemia Mother     Hypertension Father      Health Maintenance Due   Topic    COVID-19 Vaccine (1)    Pneumococcal Vaccine: Pediatrics (0 to 5 Years) and At-Risk Patients (6 to 59 Years) (1 - PCV)    HIV Screening     BMI: Followup Plan     Annual Physical     DTaP,Tdap,and Td Vaccines (1 - Tdap)    Influenza Vaccine (Season Ended)      Meds/Allergies       Current Outpatient Medications:     acetaminophen (TYLENOL) 500 mg tablet, Take 500 mg by mouth every 6 (six) hours as needed for mild pain, Disp: , Rfl:     loratadine (CLARITIN) 10 mg tablet, Take 10 mg by mouth daily, Disp: , Rfl:     meloxicam (Mobic) 15 mg tablet, Take 1 tablet (15 mg total) by mouth daily, Disp: 20 tablet, Rfl: 0      Objective:    Vitals:   /80   Pulse 71   Ht 5' 10" (1 778 m)   Wt 130 kg (287 lb)   SpO2 98%   BMI 41 18 kg/m²   Body mass index is 41 18 kg/m²  Vitals:    06/09/22 1720   Weight: 130 kg (287 lb)       Physical Exam  Constitutional:       Appearance: He is well-developed  HENT:      Head: Normocephalic and atraumatic  Eyes:      Conjunctiva/sclera: Conjunctivae normal    Neck:      Thyroid: No thyromegaly  Vascular: No JVD  Cardiovascular:      Rate and Rhythm: Regular rhythm  Heart sounds: Normal heart sounds  Pulmonary:      Effort: No respiratory distress  Breath sounds: Normal breath sounds  No wheezing or rales  Musculoskeletal:      Cervical back: No rigidity, spasms, torticollis, bony tenderness or crepitus  Normal range of motion  Thoracic back: No spasms or tenderness  Lumbar back: No deformity, signs of trauma, lacerations, spasms or bony tenderness  Normal range of motion  Scoliosis present  Right knee: Normal  No bony tenderness or crepitus  Normal range of motion  No tenderness  Normal meniscus and normal patellar mobility  Left knee: Normal  No bony tenderness or crepitus  No tenderness  No LCL laxity  Normal meniscus and normal patellar mobility  Comments: Level scoliosis reported on the x-ray  Lymphadenopathy:      Cervical: No cervical adenopathy  BMI Counseling: Body mass index is 41 18 kg/m²  The BMI is above normal  Nutrition recommendations include decreasing portion sizes, encouraging healthy choices of fruits and vegetables, decreasing fast food intake, consuming healthier snacks, limiting drinks that contain sugar, moderation in carbohydrate intake, increasing intake of lean protein and reducing intake of saturated and trans fat  Exercise recommendations include moderate physical activity 150 minutes/week and exercising 3-5 times per week  Rationale for BMI follow-up plan is due to patient being overweight or obese  Depression Screening and Follow-up Plan: Patient was screened for depression during today's encounter  They screened negative with a PHQ-2 score of 0  BMI Counseling: Body mass index is 41 18 kg/m²  The BMI is above normal  Nutrition recommendations include reducing portion sizes, decreasing overall calorie intake, 3-5 servings of fruits/vegetables daily and reducing fast food intake  Exercise recommendations include exercising 3-5 times per week  Lab Review   Orders Only on 05/27/2022   Component Date Value Ref Range Status    Total Cholesterol 05/27/2022 148  <200 mg/dL Final    HDL 05/27/2022 42  > OR = 40 mg/dL Final    Triglycerides 05/27/2022 70  <150 mg/dL Final    LDL Calculated 05/27/2022 90  mg/dL (calc) Final    Comment: Reference range: <100     Desirable range <100 mg/dL for primary prevention;    <70 mg/dL for patients with CHD or diabetic patients   with > or = 2 CHD risk factors  LDL-C is now calculated using the Freddie-Sánchez   calculation, which is a validated novel method providing   better accuracy than the Friedewald equation in the   estimation of LDL-C  Segundo Melgoza  [place] [id number];636(24): [phone]   (http://[employer]/faq/DZV739)      Chol HDLC Ratio 05/27/2022 3 5  <5 0 (calc) Final    Non-HDL Cholesterol 05/27/2022 106  <130 mg/dL (calc) Final    Comment: For patients with diabetes plus 1 major ASCVD risk   factor, treating to a non-HDL-C goal of <100 mg/dL   (LDL-C of <70 mg/dL) is considered a therapeutic   option        Glucose, Random 05/27/2022 87  65 - 99 mg/dL Final    Comment:               Fasting reference interval         BUN 05/27/2022 12  7 - 25 mg/dL Final    Creatinine 05/27/2022 0 85  0 60 - 1 35 mg/dL Final    eGFR Non  05/27/2022 116  > OR = 60 mL/min/1 73m2 Final    eGFR African American 05/27/2022 135  > OR = 60 mL/min/1 73m2 Final    SL AMB BUN/CREATININE RATIO 52/55/5550 NOT APPLICABLE  6 - 22 (calc) Final  Sodium 05/27/2022 140  135 - 146 mmol/L Final    Potassium 05/27/2022 4 7  3 5 - 5 3 mmol/L Final    Chloride 05/27/2022 104  98 - 110 mmol/L Final    CO2 05/27/2022 29  20 - 32 mmol/L Final    Calcium 05/27/2022 9 1  8 6 - 10 3 mg/dL Final    Protein, Total 05/27/2022 6 5  6 1 - 8 1 g/dL Final    Albumin 05/27/2022 4 3  3 6 - 5 1 g/dL Final    Globulin 05/27/2022 2 2  1 9 - 3 7 g/dL (calc) Final    Albumin/Globulin Ratio 05/27/2022 2 0  1 0 - 2 5 (calc) Final    TOTAL BILIRUBIN 05/27/2022 1 0  0 2 - 1 2 mg/dL Final    Alkaline Phosphatase 05/27/2022 57  36 - 130 U/L Final    AST 05/27/2022 20  10 - 40 U/L Final    ALT 05/27/2022 41  9 - 46 U/L Final    Sedimentation Rate 05/27/2022 2  < OR = 15 mm/h Final    White Blood Cell Count 05/27/2022 7 6  3 8 - 10 8 Thousand/uL Final    Red Blood Cell Count 05/27/2022 5 17  4 20 - 5 80 Million/uL Final    Hemoglobin 05/27/2022 14 8  13 2 - 17 1 g/dL Final    HCT 05/27/2022 43 8  38 5 - 50 0 % Final    MCV 05/27/2022 84 7  80 0 - 100 0 fL Final    MCH 05/27/2022 28 6  27 0 - 33 0 pg Final    MCHC 05/27/2022 33 8  32 0 - 36 0 g/dL Final    RDW 05/27/2022 12 4  11 0 - 15 0 % Final    Platelet Count 57/39/2617 205  140 - 400 Thousand/uL Final    SL AMB MPV 05/27/2022 9 9  7 5 - 12 5 fL Final    Neutrophils (Absolute) 05/27/2022 4,461  1,500 - 7,800 cells/uL Final    Lymphocytes (Absolute) 05/27/2022 2,204  850 - 3,900 cells/uL Final    Monocytes (Absolute) 05/27/2022 669  200 - 950 cells/uL Final    Eosinophils (Absolute) 05/27/2022 213  15 - 500 cells/uL Final    Basophils ABS 05/27/2022 53  0 - 200 cells/uL Final    Neutrophils 05/27/2022 58 7  % Final    Lymphocytes 05/27/2022 29 0  % Final    Monocytes 05/27/2022 8 8  % Final    Eosinophils 05/27/2022 2 8  % Final    Basophils PCT 05/27/2022 0 7  % Final    SL AMB LYME AB SCREEN 05/27/2022 <0 90  index Final    Comment:                    Index                Interpretation -----                --------------                     < 0 90               Negative                     0  90-1 09            Equivocal                     > 1 09               Positive      As recommended by the Food and Drug Administration   (FDA), all samples with positive or equivocal   results in a Borrelia burgdorferi antibody screen  will be tested using a blot method  Positive or   equivocal screening test results should not be   interpreted as truly positive until verified as such   using a supplemental assay (e g , B  burgdorferi blot)  The screening test and/or blot for B  burgdorferi   antibodies may be falsely negative in early stages  of Lyme disease, including the period when erythema   migrans is apparent   JEMIMA Screen, IFA 05/27/2022 NEGATIVE  NEGATIVE Final    Comment: JEMIMA IFA is a first line screen for detecting the  presence of up to approximately 150 autoantibodies in  various autoimmune diseases  A negative JEMIMA IFA result  suggests an JEMIMA-associated autoimmune disease is not  present at this time, but is not definitive  If there  is high clinical suspicion for Sjogren's syndrome,  testing for anti-SS-A/Ro antibody should be considered  Anti-Chery-1 antibody should be considered for clinically  suspected inflammatory myopathies  AC-0: Negative     International Consensus on JEMIMA Patterns  (https://doi org/10 1515/hhsu-7365-6680)     For additional information, please refer to  http://[employer]/faq/GCP224  (This link is being provided for informational/  educational purposes only )          Rheumatoid Factor 05/27/2022 <14  <14 IU/mL Final    HEP C AB 05/27/2022 NON-REACTIVE  NON-REACTIVE Final    Signal to Cut-Off 05/27/2022 <0 02  <1 00 Final    Comment:    HCV antibody was non-reactive  There is no laboratory   evidence of HCV infection  In most cases, no further action is required   However,  if recent HCV exposure is suspected, a test for HCV RNA  (test code 51393) is suggested  For additional information please refer to  http://[employer]/faq/SSG14j5  (This link is being provided for informational/  educational purposes only )         TSH 05/27/2022 2 07  0 40 - 4 50 mIU/L Final    Vitamin D, 25-Hydroxy, Serum 05/27/2022 32  30 - 100 ng/mL Final    Comment: Vitamin D Status         25-OH Vitamin D:     Deficiency:                    <20 ng/mL  Insufficiency:             20 - 29 ng/mL  Optimal:                 > or = 30 ng/mL     For 25-OH Vitamin D testing on patients on   D2-supplementation and patients for whom quantitation   of D2 and D3 fractions is required, the QuestAssureD(TM)  25-OH VIT D, (D2,D3), LC/MS/MS is recommended: order   code 02826 (patients >2yrs)  See Note 1     Note 1     For additional information, please refer to   http://[employer]/faq/INC615   (This link is being provided for informational/  educational purposes only )      Color UA 05/27/2022 YELLOW  YELLOW Final    Urine Appearance 05/27/2022 CLEAR  CLEAR Final    Specific Gravity 05/27/2022 1 023  1 001 - 1 035 Final    Ph 05/27/2022 5 5  5 0 - 8 0 Final    Glucose, Urine 05/27/2022 NEGATIVE  NEGATIVE Final    Bilirubin, Urine 05/27/2022 NEGATIVE  NEGATIVE Final    Ketone, Urine 05/27/2022 NEGATIVE  NEGATIVE Final    Blood, Urine 05/27/2022 NEGATIVE  NEGATIVE Final    Protein, Urine 05/27/2022 NEGATIVE  NEGATIVE Final    SL AMB NITRITES URINE, QUAL  05/27/2022 NEGATIVE  NEGATIVE Final    Leukocyte Esterase 05/27/2022 NEGATIVE  NEGATIVE Final         Patient Instructions   Your x-ray shows mild arthritis in lower thoracic upper lumbar area as well as level scoliosis  Also you probably have some mechanical component  Be careful how you handle your back during your [place]via [place]es  Recommend trial of meloxicam which will help your knee pain neck pain and low back pain      If no better the we will be happy to see you earlier if not will see back in 3-6 weeks  Your BMI is 41  Consider losing weight  BMI Counseling: The BMI is above normal  Know Body weight goal    Weigh yourself daily or weekly as per your doctor    Nutrition recommendations include decreasing portion sizes, encouraging healthy choices of fruits and vegetables, decreasing     fast food intake, consuming healthier snacks, limiting drinks that contain sugar, moderation in carbohydrate intake, increasing     intake of lean protein, reducing intake of saturated and trans fat and reducing intake of cholesterol    Follow with Consultants as per their and our suggestion    Follow up in one month or as needed earlier    Follow all instructions as advised and discussed  Take your medications as prescribed  Call the office immediately if you experience any side effects  Ask questions if you do not understand  Keep your scheduled appointment as advised or come sooner if necessary or in doubt  Best time to call for non-urgent matter or questions on weekdays is between 9am and 12 noon  See physician for any new symptoms or worsening of current symptoms  Urgent or emergent situations call 911 and report to nearest emergency room  I spent  30 -40 minutes taking care of this patient including clinical care, conseling, collaboration, chart, lab and consultaion review as appropriate    Patient is to get labs 1 week(s) prior to next visit if advised         Dr Caro Woodard MD  [facility]       "This note has been constructed using a voice recognition system  Therefore there may be syntax, spelling, and/or grammatical errors   Please call if you have any questions  "

## 2022-06-09 NOTE — ASSESSMENT & PLAN NOTE
Weight remains significantly high BMI 41  He lost couple  Of lb weight  Diet advised  BMI Counseling:       The BMI is above normal  Know Body weight goal    Weigh yourself daily or weekly as per your doctor    Nutrition recommendations include decreasing portion sizes, encouraging healthy choices of fruits and vegetables, decreasing     fast food intake, consuming healthier snacks, limiting drinks that contain sugar, moderation in carbohydrate intake, increasing     intake of lean protein, reducing intake of saturated and trans fat and reducing intake of cholesterol

## 2022-06-09 NOTE — ASSESSMENT & PLAN NOTE
Right knee pain a there is no significant abnormality on the knee x-rays  Recommend to try meloxicam he never tried he never bought it  Will recheck back in 3-6  We weak  Appears the home probably mechanical   Recommend to lose weight    That will help

## 2022-08-29 ENCOUNTER — OFFICE VISIT (OUTPATIENT)
Dept: INTERNAL MEDICINE CLINIC | Facility: CLINIC | Age: 32
End: 2022-08-29
Payer: COMMERCIAL

## 2022-08-29 DIAGNOSIS — M54.50 CHRONIC MIDLINE LOW BACK PAIN WITHOUT SCIATICA: ICD-10-CM

## 2022-08-29 DIAGNOSIS — M54.2 NECK PAIN: ICD-10-CM

## 2022-08-29 DIAGNOSIS — E66.01 CLASS 3 SEVERE OBESITY DUE TO EXCESS CALORIES WITHOUT SERIOUS COMORBIDITY WITH BODY MASS INDEX (BMI) OF 40.0 TO 44.9 IN ADULT (HCC): ICD-10-CM

## 2022-08-29 DIAGNOSIS — M25.561 CHRONIC PAIN OF RIGHT KNEE: Primary | ICD-10-CM

## 2022-08-29 DIAGNOSIS — F32.1 CURRENT MODERATE EPISODE OF MAJOR DEPRESSIVE DISORDER WITHOUT PRIOR EPISODE (HCC): ICD-10-CM

## 2022-08-29 DIAGNOSIS — G89.29 CHRONIC MIDLINE LOW BACK PAIN WITHOUT SCIATICA: ICD-10-CM

## 2022-08-29 DIAGNOSIS — G89.29 CHRONIC PAIN OF RIGHT KNEE: Primary | ICD-10-CM

## 2022-08-29 PROCEDURE — 3725F SCREEN DEPRESSION PERFORMED: CPT | Performed by: INTERNAL MEDICINE

## 2022-08-29 PROCEDURE — 99214 OFFICE O/P EST MOD 30 MIN: CPT | Performed by: INTERNAL MEDICINE

## 2022-08-29 RX ORDER — ESCITALOPRAM OXALATE 10 MG/1
10 TABLET ORAL DAILY
Qty: 15 TABLET | Refills: 1 | Status: SHIPPED | OUTPATIENT
Start: 2022-08-29 | End: 2022-09-29 | Stop reason: SDUPTHER

## 2022-08-29 NOTE — ASSESSMENT & PLAN NOTE
Chronic since last visit  Couple of days a week  Last whole day    No radiation,  No neurologist sx of upper extremity  Will get cervical spine xray  Will refer to Physical therapy  rec take tylenol  500 mg every six hour as needed    Apply lidocaine or asprecream twice a day as appropriate    No weight lifting or pulling or pushing

## 2022-08-29 NOTE — PATIENT INSTRUCTIONS
Neck pain recommendations: Will get cervical spine xray  Will refer to Physical therapy  rec take tylenol  500 mg every six hour as needed    Apply lidocaine or asprecream twice a day as appropriate    No weight lifting or pulling or pushing    Low back pain:  Will refer to Physical therapy  rec take tylenol  500 mg every six hour as needed    Apply lidocaine or asprecream twice a day as appropriate    No weight lifting or pulling or pushing    High-fiber diet, keep track of calorie count, weigh yourself every day, increase fluid intake,    Follow with Consultants as per their and our suggestion    Follow up in 3  week(s) or as needed earlier    Follow all instructions as advised and discussed  Take your medications as prescribed  Call the office immediately if you experience any side effects  Ask questions if you do not understand  Keep your scheduled appointment as advised or come sooner if necessary or in doubt  Best time to call for non-urgent matter or questions on weekdays is between 9am and 12 noon  See physician for any new symptoms or worsening of current symptoms  Urgent or emergent situations call 911 and report to nearest emergency room  I spent  30 minutes taking care of this patient including clinical care, conseling, collaboration, chart, lab and consultaion review      Patient is to get labs 1 week(s)

## 2022-08-29 NOTE — PROGRESS NOTES
[facility] Office Visit Note  22     Noni Bradley 28 y o  male MRN: [id number]  : 1990    Assessment:     1  Chronic pain of right knee  Assessment & Plan:  Knee is feeling better      2  Neck pain  Assessment & Plan:  Chronic since last visit  Couple of days a week  Last whole day  No radiation,  No neurologist sx of upper extremity  Will get cervical spine xray  Will refer to Physical therapy  rec take tylenol  500 mg every six hour as needed    Apply lidocaine or asprecream twice a day as appropriate    No weight lifting or pulling or pushing    Orders:  -     Ambulatory Referral to Physical Therapy; Future  -     CBC; Future; Expected date: 2022  -     Sedimentation rate, automated; Future; Expected date: 2022  -     Comprehensive metabolic panel; Future; Expected date: 2022  -     Ambulatory referral to Orthopedic Surgery; Future    3  Class 3 severe obesity due to excess calories without serious comorbidity with body mass index (BMI) of 40 0 to 44 9 in adult [facility])  -     Ambulatory Referral to Weight Management; Future    4  Chronic midline low back pain without sciatica  -     Ambulatory Referral to Physical Therapy; Future  -     CBC; Future; Expected date: 2022  -     Sedimentation rate, automated; Future; Expected date: 2022  -     Comprehensive metabolic panel; Future; Expected date: 2022  -     Ambulatory referral to Orthopedic Surgery; Future    5  Current moderate episode of major depressive disorder without prior episode (HCC)  -     escitalopram (Lexapro) 10 mg tablet; Take 1 tablet (10 mg total) by mouth daily          Discussion Summary and Plan: Today's care plan and medications were reviewed with patient in detail and all their questions answered to their satisfaction      Chief Complaint   Patient presents with    Back Pain     Neck pain      Obesity    Follow-up    Depression      Subjective:  Here for follow-up of multiple problems  First his knee pain is better    Neck pain persist   Chronic since last visit  Couple of days a week  Last whole day  No radiation,  No neurologist sx of upper extremity  Pain level 7/10   Worse on urning right,  Better with massaging, pressure point  Uses neck massager    Low Back Pain    Chronic since last visit  Couple of days a week  Last whole day  No radiation,  No neurologist sx of upper extremity  Tylenol dulls the pain  7/10 when worse  Xray reviewed  Mild degenerative disc disease at thoracolumbar junction     Minimal levoscoliosis, apex L2-3     No acute lumbar spinal abnormalities identified    New c/o depression for 2 weeks, no previous hx of depression, no alcohol addition, 1-2 times a week, takes THC gummies for back pain, no other street drugs  Does use Vap  Live with parents  Works in [place] [employer]  Does have girl friend for a year  Precipitating sx include finances, relationship, work, relationshp with girl friend and future- wants to live by him self  PHQ-9 Depression Screening    Little interest or pleasure in doing things: 2 - more than half the days  Feeling down, depressed, or hopeless: 2 - more than half the days  Trouble falling or staying asleep, or sleeping too much: 2 - more than   half the days  Feeling tired or having little energy: 2 - more than half the days  Poor appetite or overeatin - several days  Feeling bad about yourself - or that you are a failure or have let   yourself or your family down: 1 - several days  Trouble concentrating on things, such as reading the newspaper or watching   television: 1 - several days  Moving or speaking so slowly that other people could have noticed   Or the   opposite - being so fidgety or restless that you have been moving around a   lot more than usual: 0 - not at all  Thoughts that you would be better off dead, or of hurting yourself in some   way: 0 - not at all  PHQ-9 Score: 11  Score Interpretation: Moderate depression JOHN-7 Flowsheet Screening    Flowsheet Row Most Recent Value   Over the last 2 weeks, how often have you been bothered by any of the   following problems? Feeling nervous, anxious, or on edge 1   Not being able to stop or control worrying 1   Worrying too much about different things 1  (relationship, future, money,   family)   Trouble relaxing 1   Being so restless that it is hard to sit still 0   Becoming easily annoyed or irritable 0   Feeling afraid as if something awful might happen 0   JOHN-7 Total Score 4            Obesity: reviewed    05/27/2022:  Urinalysis normal, lipid profile normal with LDL is 90, CMP essentially normal with blood sugar 87, CBC essentially normal, Lyme titer negative, rheumatoid factor negative, hepatitis-C negative, vitamin-D 32, TSH 2 07, her cholesterol 148, triglyceride 70, LDL 90, sed rate 2 urinalysis normal    05/23/2022 knee x-rays essentially normal   Lumbar spine reveals lower thoracic upper lumbar DJD otherwise unremarkable but also does have a level scoliosis    Obesity: watches  Mostly what he watches      The following portions of the patient's history were reviewed and updated as appropriate: allergies, current medications, past family history, past medical history, past social history, past surgical history and problem list     Review of Systems   Musculoskeletal: Positive for arthralgias and back pain  Psychiatric/Behavioral: Positive for decreased concentration, dysphoric mood and sleep disturbance  Negative for agitation, behavioral problems, confusion, hallucinations, self-injury and suicidal ideas  The patient is nervous/anxious  The patient is not hyperactive  All other systems reviewed and are negative          Historical Information   Patient Active Problem List   Diagnosis    Chronic pain of right knee    Chronic midline low back pain without sciatica    Class 3 severe obesity due to excess calories without serious comorbidity in adult [facility])    Episodic tension-type headache    Neck pain    Levoscoliosis    Current moderate episode of major depressive disorder without prior episode ([address] )     Past Medical History:   Diagnosis Date    Allergic     Anxiety     Off and on     Past Surgical History:   Procedure Laterality Date    WISDOM TOOTH EXTRACTION       Social History     Substance and Sexual Activity   Alcohol Use Not Currently     Social History     Substance and Sexual Activity   Drug Use Never     Social History     Tobacco Use   Smoking Status Current Every Day Smoker   Smokeless Tobacco Never Used     Family History   Problem Relation Age of Onset    Asthma Mother     Thyroid disease Mother     Hyperlipidemia Mother     Hypertension Father      Health Maintenance Due   Topic    COVID-19 Vaccine (1)    Pneumococcal Vaccine: Pediatrics (0 to 5 Years) and At-Risk Patients (6 to 59 Years) (1 - PCV)    HIV Screening     Annual Physical     DTaP,Tdap,and Td Vaccines (1 - Tdap)    Influenza Vaccine (1)      Meds/Allergies       Current Outpatient Medications:     acetaminophen (TYLENOL) 500 mg tablet, Take 500 mg by mouth every 6 (six) hours as needed for mild pain, Disp: , Rfl:     escitalopram (Lexapro) 10 mg tablet, Take 1 tablet (10 mg total) by mouth daily, Disp: 15 tablet, Rfl: 1    loratadine (CLARITIN) 10 mg tablet, Take 10 mg by mouth daily, Disp: , Rfl:       Objective:    Vitals:   /80   Pulse 87   Ht 5' 10" (1 778 m)   Wt 129 kg (285 lb)   SpO2 98%   BMI 40 89 kg/m²   Body mass index is 40 89 kg/m²  Vitals:    08/29/22 1530   Weight: 129 kg (285 lb)       Physical Exam  Constitutional:       General: He is not in acute distress  Appearance: He is well-developed  He is not ill-appearing or diaphoretic  HENT:      Head: Normocephalic and atraumatic  Right Ear: External ear normal       Left Ear: External ear normal    Eyes:      General: Lids are normal          Right eye: No discharge           Left eye: No discharge  Conjunctiva/sclera: Conjunctivae normal    Neck:      Thyroid: No thyroid mass or thyromegaly  Vascular: No carotid bruit or JVD  Trachea: Trachea normal    Cardiovascular:      Rate and Rhythm: Regular rhythm  Heart sounds: Normal heart sounds  Pulmonary:      Effort: No respiratory distress  Breath sounds: Normal breath sounds  No wheezing or rales  Abdominal:      General: Bowel sounds are normal  There is no distension  Palpations: There is no mass  Tenderness: There is no rebound  Musculoskeletal:      Right lower leg: No edema  Left lower leg: No edema  Lymphadenopathy:      Cervical: No cervical adenopathy  Skin:     General: Skin is warm  Coloration: Skin is not jaundiced or pale  Findings: No rash  Neurological:      Mental Status: He is alert and oriented to person, place, and time  Coordination: Coordination normal    Psychiatric:         Attention and Perception: He does not perceive auditory hallucinations  Mood and Affect: Mood is anxious and depressed  Affect is not labile, blunt, flat, angry, tearful or inappropriate  Speech: Speech normal          Behavior: Behavior normal  Behavior is not agitated, aggressive, withdrawn, hyperactive or combative  Thought Content: Thought content is not paranoid or delusional  Thought content does not include homicidal or suicidal ideation  Thought content does not include suicidal plan  Cognition and Memory: Cognition and memory normal  Cognition is not impaired  Memory is not impaired  He does not exhibit impaired recent memory  Judgment: Judgment normal          Lab Review   No visits with results within 2 Month(s) from this visit     Latest known visit with results is:   Orders Only on 05/27/2022   Component Date Value Ref Range Status    Total Cholesterol 05/27/2022 148  <200 mg/dL Final    HDL 05/27/2022 42  > OR = 40 mg/dL Final    Triglycerides 05/27/2022 70  <150 mg/dL Final    LDL Calculated 05/27/2022 90  mg/dL (calc) Final    Comment: Reference range: <100     Desirable range <100 mg/dL for primary prevention;    <70 mg/dL for patients with CHD or diabetic patients   with > or = 2 CHD risk factors  LDL-C is now calculated using the Freddie-Sánchez   calculation, which is a validated novel method providing   better accuracy than the Friedewald equation in the   estimation of LDL-C  Kylah Gilmore  Jody Armendariz  3301;402(64): 0647-2574   (http://[employer]/faq/QDF031)      Chol HDLC Ratio 05/27/2022 3 5  <5 0 (calc) Final    Non-HDL Cholesterol 05/27/2022 106  <130 mg/dL (calc) Final    Comment: For patients with diabetes plus 1 major ASCVD risk   factor, treating to a non-HDL-C goal of <100 mg/dL   (LDL-C of <70 mg/dL) is considered a therapeutic   option        Glucose, Random 05/27/2022 87  65 - 99 mg/dL Final    Comment:               Fasting reference interval         BUN 05/27/2022 12  7 - 25 mg/dL Final    Creatinine 05/27/2022 0 85  0 60 - 1 35 mg/dL Final    eGFR Non  05/27/2022 116  > OR = 60 mL/min/1 73m2 Final    eGFR African American 05/27/2022 135  > OR = 60 mL/min/1 73m2 Final    SL AMB BUN/CREATININE RATIO 19/54/1347 NOT APPLICABLE  6 - 22 (calc) Final    Sodium 05/27/2022 140  135 - 146 mmol/L Final    Potassium 05/27/2022 4 7  3 5 - 5 3 mmol/L Final    Chloride 05/27/2022 104  98 - 110 mmol/L Final    CO2 05/27/2022 29  20 - 32 mmol/L Final    Calcium 05/27/2022 9 1  8 6 - 10 3 mg/dL Final    Protein, Total 05/27/2022 6 5  6 1 - 8 1 g/dL Final    Albumin 05/27/2022 4 3  3 6 - 5 1 g/dL Final    Globulin 05/27/2022 2 2  1 9 - 3 7 g/dL (calc) Final    Albumin/Globulin Ratio 05/27/2022 2 0  1 0 - 2 5 (calc) Final    TOTAL BILIRUBIN 05/27/2022 1 0  0 2 - 1 2 mg/dL Final    Alkaline Phosphatase 05/27/2022 57  36 - 130 U/L Final    AST 05/27/2022 20  10 - 40 U/L Final    ALT 05/27/2022 41  9 - 46 U/L Final    Sedimentation Rate 05/27/2022 2  < OR = 15 mm/h Final    White Blood Cell Count 05/27/2022 7 6  3 8 - 10 8 Thousand/uL Final    Red Blood Cell Count 05/27/2022 5 17  4 20 - 5 80 Million/uL Final    Hemoglobin 05/27/2022 14 8  13 2 - 17 1 g/dL Final    HCT 05/27/2022 43 8  38 5 - 50 0 % Final    MCV 05/27/2022 84 7  80 0 - 100 0 fL Final    MCH 05/27/2022 28 6  27 0 - 33 0 pg Final    MCHC 05/27/2022 33 8  32 0 - 36 0 g/dL Final    RDW 05/27/2022 12 4  11 0 - 15 0 % Final    Platelet Count 88/85/4363 205  140 - 400 Thousand/uL Final    SL AMB MPV 05/27/2022 9 9  7 5 - 12 5 fL Final    Neutrophils (Absolute) 05/27/2022 4,461  1,500 - 7,800 cells/uL Final    Lymphocytes (Absolute) 05/27/2022 2,204  850 - 3,900 cells/uL Final    Monocytes (Absolute) 05/27/2022 669  200 - 950 cells/uL Final    Eosinophils (Absolute) 05/27/2022 213  15 - 500 cells/uL Final    Basophils ABS 05/27/2022 53  0 - 200 cells/uL Final    Neutrophils 05/27/2022 58 7  % Final    Lymphocytes 05/27/2022 29 0  % Final    Monocytes 05/27/2022 8 8  % Final    Eosinophils 05/27/2022 2 8  % Final    Basophils PCT 05/27/2022 0 7  % Final    SL AMB LYME AB SCREEN 05/27/2022 <0 90  index Final    Comment:                    Index                Interpretation                     -----                --------------                     < 0 90               Negative                     0  90-1 09            Equivocal                     > 1 09               Positive      As recommended by the Food and Drug Administration   (FDA), all samples with positive or equivocal   results in a Borrelia burgdorferi antibody screen  will be tested using a blot method  Positive or   equivocal screening test results should not be   interpreted as truly positive until verified as such   using a supplemental assay (e g , B  burgdorferi blot)       The screening test and/or blot for B  burgdorferi   antibodies may be falsely negative in early stages  of Lyme disease, including the period when erythema   migrans is apparent   JEMIMA Screen, IFA 05/27/2022 NEGATIVE  NEGATIVE Final    Comment: JEMIMA IFA is a first line screen for detecting the  presence of up to approximately 150 autoantibodies in  various autoimmune diseases  A negative JEMIMA IFA result  suggests an JEMIMA-associated autoimmune disease is not  present at this time, but is not definitive  If there  is high clinical suspicion for Sjogren's syndrome,  testing for anti-SS-A/Ro antibody should be considered  Anti-Chery-1 antibody should be considered for clinically  suspected inflammatory myopathies  AC-0: Negative     International Consensus on JEMIMA Patterns  (https://doi org/10 1515/qypg-4426-8768)     For additional information, please refer to  http://[employer]/faq/BEF602  (This link is being provided for informational/  educational purposes only )          Rheumatoid Factor 05/27/2022 <14  <14 IU/mL Final    HEP C AB 05/27/2022 NON-REACTIVE  NON-REACTIVE Final    Signal to Cut-Off 05/27/2022 <0 02  <1 00 Final    Comment:    HCV antibody was non-reactive  There is no laboratory   evidence of HCV infection  In most cases, no further action is required  However,  if recent HCV exposure is suspected, a test for HCV RNA  (test code 69293) is suggested  For additional information please refer to  http://[employer]/faq/ZMT65d6  (This link is being provided for informational/  educational purposes only )         TSH 05/27/2022 2 07  0 40 - 4 50 mIU/L Final    Vitamin D, 25-Hydroxy, Serum 05/27/2022 32  30 - 100 ng/mL Final    Comment: Vitamin D Status         25-OH Vitamin D:     Deficiency:                    <20 ng/mL  Insufficiency:             20 - 29 ng/mL  Optimal:                 > or = 30 ng/mL     For 25-OH Vitamin D testing on patients on   D2-supplementation and patients for whom quantitation   of D2 and D3 fractions is required, the QuestAssureD(TM)  25-OH VIT D, (D2,D3), LC/MS/MS is recommended: order   code 65375 (patients >2yrs)  See Note 1     Note 1     For additional information, please refer to   http://[employer]/faq/WHW189   (This link is being provided for informational/  educational purposes only )      Color UA 05/27/2022 YELLOW  YELLOW Final    Urine Appearance 05/27/2022 CLEAR  CLEAR Final    Specific Gravity 05/27/2022 1 023  1 001 - 1 035 Final    Ph 05/27/2022 5 5  5 0 - 8 0 Final    Glucose, Urine 05/27/2022 NEGATIVE  NEGATIVE Final    Bilirubin, Urine 05/27/2022 NEGATIVE  NEGATIVE Final    Ketone, Urine 05/27/2022 NEGATIVE  NEGATIVE Final    Blood, Urine 05/27/2022 NEGATIVE  NEGATIVE Final    Protein, Urine 05/27/2022 NEGATIVE  NEGATIVE Final    SL AMB NITRITES URINE, QUAL  05/27/2022 NEGATIVE  NEGATIVE Final    Leukocyte Esterase 05/27/2022 NEGATIVE  NEGATIVE Final         Patient Instructions   Neck pain recommendations: Will get cervical spine xray  Will refer to Physical therapy  rec take tylenol  500 mg every six hour as needed    Apply lidocaine or asprecream twice a day as appropriate    No weight lifting or pulling or pushing    Low back pain:  Will refer to Physical therapy  rec take tylenol  500 mg every six hour as needed    Apply lidocaine or asprecream twice a day as appropriate    No weight lifting or pulling or pushing    High-fiber diet, keep track of calorie count, weigh yourself every day, increase fluid intake,    Follow with Consultants as per their and our suggestion    Follow up in 3  week(s) or as needed earlier    Follow all instructions as advised and discussed  Take your medications as prescribed  Call the office immediately if you experience any side effects  Ask questions if you do not understand  Keep your scheduled appointment as advised or come sooner if necessary or in doubt      Best time to call for non-urgent matter or questions on weekdays is between 9am and 12 noon  See physician for any new symptoms or worsening of current symptoms  Urgent or emergent situations call 911 and report to nearest emergency room  I spent  30 minutes taking care of this patient including clinical care, conseling, collaboration, chart, lab and consultaion review  Patient is to get labs 1 week(s)          Dr Josefina Woodson MD  [facility]       "This note has been constructed using a voice recognition system  Therefore there may be syntax, spelling, and/or grammatical errors   Please call if you have any questions  "

## 2022-08-30 VITALS
HEIGHT: 70 IN | WEIGHT: 285 LBS | HEART RATE: 87 BPM | BODY MASS INDEX: 40.8 KG/M2 | SYSTOLIC BLOOD PRESSURE: 134 MMHG | DIASTOLIC BLOOD PRESSURE: 80 MMHG | OXYGEN SATURATION: 98 %

## 2022-09-29 DIAGNOSIS — F32.1 CURRENT MODERATE EPISODE OF MAJOR DEPRESSIVE DISORDER WITHOUT PRIOR EPISODE (HCC): ICD-10-CM

## 2022-09-30 RX ORDER — ESCITALOPRAM OXALATE 10 MG/1
10 TABLET ORAL DAILY
Qty: 15 TABLET | Refills: 1 | Status: SHIPPED | OUTPATIENT
Start: 2022-09-30

## 2022-11-10 DIAGNOSIS — F32.1 CURRENT MODERATE EPISODE OF MAJOR DEPRESSIVE DISORDER WITHOUT PRIOR EPISODE (HCC): ICD-10-CM

## 2022-11-10 RX ORDER — ESCITALOPRAM OXALATE 10 MG/1
10 TABLET ORAL DAILY
Qty: 30 TABLET | Refills: 1 | Status: SHIPPED | OUTPATIENT
Start: 2022-11-10

## 2023-02-08 DIAGNOSIS — F32.1 CURRENT MODERATE EPISODE OF MAJOR DEPRESSIVE DISORDER WITHOUT PRIOR EPISODE (HCC): ICD-10-CM

## 2023-02-08 RX ORDER — ESCITALOPRAM OXALATE 10 MG/1
TABLET ORAL
Qty: 30 TABLET | Refills: 0 | Status: SHIPPED | OUTPATIENT
Start: 2023-02-08

## 2023-03-17 DIAGNOSIS — F32.1 CURRENT MODERATE EPISODE OF MAJOR DEPRESSIVE DISORDER WITHOUT PRIOR EPISODE (HCC): ICD-10-CM

## 2023-03-17 RX ORDER — ESCITALOPRAM OXALATE 10 MG/1
TABLET ORAL
Qty: 30 TABLET | Refills: 0 | Status: SHIPPED | OUTPATIENT
Start: 2023-03-17

## 2023-04-21 PROBLEM — M54.2 NECK PAIN: Status: RESOLVED | Noted: 2022-05-10 | Resolved: 2023-04-21

## 2023-04-21 PROBLEM — G44.219 EPISODIC TENSION-TYPE HEADACHE: Status: RESOLVED | Noted: 2022-05-10 | Resolved: 2023-04-21

## 2025-01-27 ENCOUNTER — TELEPHONE (OUTPATIENT)
Dept: INTERNAL MEDICINE CLINIC | Facility: CLINIC | Age: 35
End: 2025-01-27